# Patient Record
Sex: FEMALE | Race: ASIAN
[De-identification: names, ages, dates, MRNs, and addresses within clinical notes are randomized per-mention and may not be internally consistent; named-entity substitution may affect disease eponyms.]

---

## 2022-05-02 ENCOUNTER — HOSPITAL ENCOUNTER (EMERGENCY)
Dept: HOSPITAL 63 - ER | Age: 62
Discharge: HOME | End: 2022-05-02
Payer: MEDICAID

## 2022-05-02 VITALS — BODY MASS INDEX: 35.08 KG/M2 | HEIGHT: 65 IN | WEIGHT: 210.54 LBS

## 2022-05-02 VITALS — SYSTOLIC BLOOD PRESSURE: 129 MMHG | DIASTOLIC BLOOD PRESSURE: 68 MMHG

## 2022-05-02 DIAGNOSIS — S20.212A: Primary | ICD-10-CM
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
## 2022-05-02 PROCEDURE — 99283 EMERGENCY DEPT VISIT LOW MDM: CPT

## 2022-05-02 PROCEDURE — 71101 X-RAY EXAM UNILAT RIBS/CHEST: CPT

## 2022-05-02 NOTE — RAD
Single view chest and left-sided rib study dated 5/2/2022



COMPARISON: None



INDICATION: Pain after injury.



FINDINGS:



Single PA view the chest shows normal heart and mediastinal contours. Lungs are somewhat hyperinflate
d but otherwise clear. No consolidation or pleural effusion. No pneumothorax.



Irregular views of left-sided ribs show no evidence of displaced left rib fracture. No acute bony abn
ormality.



IMPRESSION:

1. No acute radiographic abnormality. No evidence of displaced left rib fracture.



Electronically signed by: Jac Reese MD (5/2/2022 3:50 PM) [facility]
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
